# Patient Record
Sex: MALE | Race: WHITE | URBAN - METROPOLITAN AREA
[De-identification: names, ages, dates, MRNs, and addresses within clinical notes are randomized per-mention and may not be internally consistent; named-entity substitution may affect disease eponyms.]

---

## 2020-01-29 ENCOUNTER — OFFICE VISIT (OUTPATIENT)
Dept: FAMILY MEDICINE CLINIC | Facility: CLINIC | Age: 20
End: 2020-01-29
Payer: COMMERCIAL

## 2020-01-29 VITALS
WEIGHT: 240 LBS | BODY MASS INDEX: 34.75 KG/M2 | HEIGHT: 69.75 IN | HEART RATE: 64 BPM | DIASTOLIC BLOOD PRESSURE: 56 MMHG | SYSTOLIC BLOOD PRESSURE: 90 MMHG | RESPIRATION RATE: 16 BRPM

## 2020-01-29 DIAGNOSIS — Z20.2 EXPOSURE TO CHLAMYDIA: Primary | ICD-10-CM

## 2020-01-29 PROCEDURE — 87591 N.GONORRHOEAE DNA AMP PROB: CPT | Performed by: NURSE PRACTITIONER

## 2020-01-29 PROCEDURE — 87491 CHLMYD TRACH DNA AMP PROBE: CPT | Performed by: NURSE PRACTITIONER

## 2020-01-29 PROCEDURE — 99203 OFFICE O/P NEW LOW 30 MIN: CPT | Performed by: NURSE PRACTITIONER

## 2020-01-29 RX ORDER — AZITHROMYCIN 500 MG/1
1000 TABLET, FILM COATED ORAL ONCE
Qty: 2 TABLET | Refills: 0 | Status: SHIPPED | OUTPATIENT
Start: 2020-01-29 | End: 2020-01-29

## 2020-01-29 NOTE — PATIENT INSTRUCTIONS
Take Zithromax today. Urine test pending. Get other labs at [address] [place] [address].     No intercourse for the next 7- 10 days. Then recommend wearing a condom.      Follow-up if any problems and as needed  Azithromycin tablets  Brand Names: Zithromax, Zithromax Tri require medical attention (report to your doctor or health care professional if they continue or are bothersome):  · diarrhea  · nausea  · stomach pain  · vomiting  What may interact with this medicine?   Do not take this medicine with any of the following beds/booths. NOTE:This sheet is a summary. It may not cover all possible information. If you have questions about this medicine, talk to your doctor, pharmacist, or health care provider.  Copyright© 2019 [employer]        Testing for Suspected STI  Your symp This is to be sure the infection has cleared. Follow up with your provider or the public health department for complete STI screening, including HIV testing, and to consider ways to prevent HIV.  For more information about STIs, call the CDC information gwyn

## 2020-01-29 NOTE — PROGRESS NOTES
HPI:    Patient ID: Brenton Maradiaga is a 22year old male. HPI     Present as a new patient to the clinic for STD testing. Girlfriend tested positive for chlamydia. Has been with her about a month. Was tested for HIV in October 2018 for the [employer].  Kathie Tame[facility] Nursing note and vitals reviewed.              ASSESSMENT/PLAN:   Exposure to chlamydia  (primary encounter diagnosis)    Orders Placed This Encounter      T [facility] Screening [place]      HIV AG AB Combo      Hepatitis B Surface Antibody      Hepatitis B · allergic reactions like skin rash, itching or hives, swelling of the face, lips, or tongue  · bloody or watery diarrhea  · breathing problems  · chest pain  · fast, irregular heartbeat  · muscle weakness  · redness, blistering, peeling or loosening of th · an unusual or allergic reaction to azithromycin, erythromycin, other macrolide antibiotics, foods, dyes, or preservatives  · pregnant or trying to get pregnant  · breast-feeding  What should I watch for while using this medicine?   Tell your doctor or hea · You will be prescribed antibiotic medicine. Be sure to take all of the antibiotic as prescribed until it is gone or you are told to stop. Keep taking it even if you feel better.   · Both you and your sexual partner or partners need to be treated, even if

## 2020-01-30 ENCOUNTER — TELEPHONE (OUTPATIENT)
Dept: FAMILY MEDICINE CLINIC | Facility: CLINIC | Age: 20
End: 2020-01-30

## 2020-01-30 LAB
C TRACH DNA SPEC QL NAA+PROBE: POSITIVE
N GONORRHOEA DNA SPEC QL NAA+PROBE: NEGATIVE

## 2020-01-30 NOTE — TELEPHONE ENCOUNTER
----- Message from CHRIS Waldrop sent at 1/30/2020 12:13 PM CST -----  Patient was treated with a gram of Zithromax yesterday. Please complete the report for the health department. Also let patient know. Thank you.

## 2020-01-31 LAB — SIGNAL TO CUT-OFF: 0.03

## 2020-02-01 ENCOUNTER — TELEPHONE (OUTPATIENT)
Dept: FAMILY MEDICINE CLINIC | Facility: CLINIC | Age: 20
End: 2020-02-01

## 2020-02-01 NOTE — TELEPHONE ENCOUNTER
----- Message from Sherren Portela, APRN sent at 1/31/2020 10:38 AM CST -----  STD blood testing is negative. Recommend rechecking the HIV in 4 - 6 months since a new exposure would not show immediately.  Also recommend Hep B vaccinations since no immunity

## 2020-02-07 NOTE — TELEPHONE ENCOUNTER
Faxed Report of Sexually Transmitted Diseases to [facility] Fax# [phone].